# Patient Record
(demographics unavailable — no encounter records)

---

## 2024-10-14 NOTE — HISTORY OF PRESENT ILLNESS
[de-identified] : 84 year old woman with past medical history lumbar spinal stenosis s/p L4-5 posterior lumbar fusion on 1/5/15 high cholesterol, HTN referred by Dr. Shabazz for meningioma.  Ms. Painting states that she developed more frequent headaches and worsening dizziness over the past several months. Her PCP orders MRI brain which demonstrated a small sub centimeter meningioma as well as a possible pituitary nodule.  Of note, she has a history of a lumbar fusion in 2015. She reports worsening gait as well as low back pain radiating to the RIGHT thigh. She reports symptoms are worsening. She has done supervised physical therapy with no improvement. Over the past year, she has had several injections in the low back with no improvement in symptoms.  She states she is unable to walk long distances due to debilitating pain. She uses a cane for assistance with walking.  3/1/24 Office Visit: returns to review MRI lumbar spine without contrast done on 2/20/24. She continues to have RIGHT sided low back pain radiating to RIGHT thigh. She previously has done PT with no improvement in symptoms. She has seen pain management and had 3 injections about 1.5 years ago with no improvement in symptoms. MRI L-spine was reviewed which showed severe L2-4 stenosis. Discussed possible surgery of decompression and fusion. Patient would like to continue conservative management at this time.  Plan was made to return in July with new brain MRI for meningioma.  9/24/24 visit MRI L-spine and MR brain was reviewed which showed stable meningioma and severe L2-4 adjacent segmang disease. Plan was made to repeat MRI brain w/wo in on year 7/2025 and obtain CT L-spine now for possible surgery plan.

## 2024-10-14 NOTE — DATA REVIEWED
[de-identified] : L-spine wo on 10/3/24 in PACS ACC: [id number]     EXAM:  CT LUMBAR SPINE   ORDERED BY: PURVI ALBA  PROCEDURE DATE:  10/03/2024    INTERPRETATION:  LUMBOSACRAL SPINE CT  CLINICAL INFORMATION: Posterior instrumented fusion. Evaluate osseous fusion. TECHNIQUE: Axial CT images were obtained of the lumbosacral spine with coronal and sagittal reconstructions. Comparison is made to 10/5/2021.  FINDINGS:  ALIGNMENT: Grade 1 anterolisthesis at L2-L3 from L3-L4 and L4-L5. Mild retrolisthesis of L5-S1.  DISC SPACES:  L1/L2: Moderate bilateral facet arthrosis with mild disc bulging. Mild osseous foraminal narrowing.  L2/L3: Severe disc height loss with endplate sclerosis and gas in the disc space. Circumferential osseous ridging. Severe left facet arthrosis with fusion of the right facet joint. Status post laminectomy. Severe left lateral recess stenosis and moderate spinal canal stenosis.  L3/L4: Severe disc height loss with gas in the disc space and endplate sclerosis. Status post laminectomy. Severe left facet arthrosis. Mild left-sided osseous foraminal narrowing.  L4/L5: Posterior segment effusion with solid osseous fusion across the disc space and posterior elements.  L5/S1: Moderate disc height loss with gas diffusely throughout the disc space with endplate sclerosis and circumferential endplate osseous ridging. Moderate to severe left and moderate right foraminal narrowing. Moderate facet arthrosis. Central laminectomy decompressing the spinal canal.  SI JOINTS: Mild SI joint arthrosis bilaterally with vacuum phenomenon. INTRAABDOMINAL AND INTRAPELVIC SOFT TISSUES: Calcifications within the uterus. Colonic diverticulosis without diverticulitis. Aortobiiliac atherosclerotic disease.  IMPRESSION: Posterior instrumented fusion at L4-L5 with solid osseous fusion. Multilevel laminectomy from L2 through L5.  Severe multilevel degenerative disc disease that has significantly progressed at L2-L3 compared to the prior study.  Multilevel spinal canal, lateral recess and foraminal as detailed above and most advanced in the left lateral recess at L2-L3..  --- End of Report ---      CAMACHO CRAIG MD; Attending Radiologist This document has been electronically signed. Oct  7 2024 12:06PM

## 2024-10-14 NOTE — PHYSICAL EXAM
[General Appearance - Alert] : alert [General Appearance - In No Acute Distress] : in no acute distress [General Appearance - Well Nourished] : well nourished [General Appearance - Well-Appearing] : healthy appearing [Oriented To Time, Place, And Person] : oriented to person, place, and time [Impaired Insight] : insight and judgment were intact [Affect] : the affect was normal [Memory Recent] : recent memory was not impaired [Abnormal Walk] : normal gait [Limited Balance] : the patient's balance was impaired

## 2024-10-14 NOTE — REASON FOR VISIT
[Follow-Up: _____] : a [unfilled] follow-up visit [FreeTextEntry1] : known h/o severe L2-4 stenosis (recommended decomp/fusion surgery in last visit, patient wants to continue PT), sub centimeter parafalcine meningioma (sequential image only) returns to review CT L-spine wo (PACS) for severe adjacent segment disease

## 2024-10-14 NOTE — PHYSICAL EXAM
[General Appearance - Alert] : alert [General Appearance - Well Nourished] : well nourished [General Appearance - In No Acute Distress] : in no acute distress [General Appearance - Well-Appearing] : healthy appearing [Oriented To Time, Place, And Person] : oriented to person, place, and time [Impaired Insight] : insight and judgment were intact [Affect] : the affect was normal [Memory Recent] : recent memory was not impaired [Abnormal Walk] : normal gait [Limited Balance] : the patient's balance was impaired

## 2024-10-14 NOTE — HISTORY OF PRESENT ILLNESS
[de-identified] : 84 year old woman with past medical history lumbar spinal stenosis s/p L4-5 posterior lumbar fusion on 1/5/15 high cholesterol, HTN referred by Dr. Shabazz for meningioma.  Ms. Painting states that she developed more frequent headaches and worsening dizziness over the past several months. Her PCP orders MRI brain which demonstrated a small sub centimeter meningioma as well as a possible pituitary nodule.  Of note, she has a history of a lumbar fusion in 2015. She reports worsening gait as well as low back pain radiating to the RIGHT thigh. She reports symptoms are worsening. She has done supervised physical therapy with no improvement. Over the past year, she has had several injections in the low back with no improvement in symptoms.  She states she is unable to walk long distances due to debilitating pain. She uses a cane for assistance with walking.  3/1/24 Office Visit: returns to review MRI lumbar spine without contrast done on 2/20/24. She continues to have RIGHT sided low back pain radiating to RIGHT thigh. She previously has done PT with no improvement in symptoms. She has seen pain management and had 3 injections about 1.5 years ago with no improvement in symptoms. MRI L-spine was reviewed which showed severe L2-4 stenosis. Discussed possible surgery of decompression and fusion. Patient would like to continue conservative management at this time.  Plan was made to return in July with new brain MRI for meningioma.  9/24/24 visit MRI L-spine and MR brain was reviewed which showed stable meningioma and severe L2-4 adjacent segmang disease. Plan was made to repeat MRI brain w/wo in on year 7/2025 and obtain CT L-spine now for possible surgery plan.

## 2024-10-14 NOTE — DATA REVIEWED
[de-identified] : L-spine wo on 10/3/24 in PACS ACC: [id number]     EXAM:  CT LUMBAR SPINE   ORDERED BY: PURVI ALBA  PROCEDURE DATE:  10/03/2024    INTERPRETATION:  LUMBOSACRAL SPINE CT  CLINICAL INFORMATION: Posterior instrumented fusion. Evaluate osseous fusion. TECHNIQUE: Axial CT images were obtained of the lumbosacral spine with coronal and sagittal reconstructions. Comparison is made to 10/5/2021.  FINDINGS:  ALIGNMENT: Grade 1 anterolisthesis at L2-L3 from L3-L4 and L4-L5. Mild retrolisthesis of L5-S1.  DISC SPACES:  L1/L2: Moderate bilateral facet arthrosis with mild disc bulging. Mild osseous foraminal narrowing.  L2/L3: Severe disc height loss with endplate sclerosis and gas in the disc space. Circumferential osseous ridging. Severe left facet arthrosis with fusion of the right facet joint. Status post laminectomy. Severe left lateral recess stenosis and moderate spinal canal stenosis.  L3/L4: Severe disc height loss with gas in the disc space and endplate sclerosis. Status post laminectomy. Severe left facet arthrosis. Mild left-sided osseous foraminal narrowing.  L4/L5: Posterior segment effusion with solid osseous fusion across the disc space and posterior elements.  L5/S1: Moderate disc height loss with gas diffusely throughout the disc space with endplate sclerosis and circumferential endplate osseous ridging. Moderate to severe left and moderate right foraminal narrowing. Moderate facet arthrosis. Central laminectomy decompressing the spinal canal.  SI JOINTS: Mild SI joint arthrosis bilaterally with vacuum phenomenon. INTRAABDOMINAL AND INTRAPELVIC SOFT TISSUES: Calcifications within the uterus. Colonic diverticulosis without diverticulitis. Aortobiiliac atherosclerotic disease.  IMPRESSION: Posterior instrumented fusion at L4-L5 with solid osseous fusion. Multilevel laminectomy from L2 through L5.  Severe multilevel degenerative disc disease that has significantly progressed at L2-L3 compared to the prior study.  Multilevel spinal canal, lateral recess and foraminal as detailed above and most advanced in the left lateral recess at L2-L3..  --- End of Report ---      CAMACHO CRAIG MD; Attending Radiologist This document has been electronically signed. Oct  7 2024 12:06PM

## 2025-04-10 NOTE — HISTORY OF PRESENT ILLNESS
[FreeTextEntry1] : Patient is a 86yo F who presents today for breast cancer screening. Patient is adopted and fhx if unknown. Patient denies palpable masses, skin changes, or nipple discharge bilaterally.  1/10/18: B/L MG & US- [facility]ABETH 1/16/19: B/L MG & US- no evidence of disease 1/15/20: B/L MG & US- EL[facility]ABETH. 1/28/21: B/L MG & US- Dense. B/l scattered calcs, stbale. LUOQ postsurgical changes, stable. L 1.1cm axillary tail LN. BIRADS 2. 2/22/22: B/L MG & US- heterogeneously dense. EL[facility]ABETH. BI-RADS 2 2/24/23: B/L MG & US- heterogeneously dense. EL[facility]ABETH. BI-RADS 2 3/13/24 B/L MG & US- extremely dense. EL[facility]AB[facility]. BI-RADS 2 4/10/25 b/l mg&us-extremely dense.  L UOQ postsurgical changes, stable.  BI-RADS 2

## 2025-04-10 NOTE — PHYSICAL EXAM
[de-identified] : Bilateral breast/axilla/supraclavicular area: No masses, discharge, or adenopathy\par

## 2025-04-10 NOTE — HISTORY OF PRESENT ILLNESS
[FreeTextEntry1] : Patient is a 84yo F who presents today for breast cancer screening. Patient is adopted and fhx if unknown. Patient denies palpable masses, skin changes, or nipple discharge bilaterally.  1/10/18: B/L MG & US- EL[facility]ETH 1/16/19: B/L MG & US- no evidence of disease 1/15/20: B/L MG & US- ELIZABETH. 1/28/21: B/L MG & US- Dense. B/l scattered calcs, stbale. LUOQ postsurgical changes, stable. L 1.1cm axillary tail LN. BIRADS 2. 2/22/22: B/L MG & US- heterogeneously dense. [facility]. BI-RADS 2 2/24/23: B/L MG & US- heterogeneously dense. ELIZABETH. BI-RADS 2 3/13/24 B/L MG & US- extremely dense. [facility]. BI-RADS 2 4/10/25 b/l mg&us-extremely dense.  L UOQ postsurgical changes, stable.  BI-RADS 2

## 2025-04-10 NOTE — PHYSICAL EXAM
[de-identified] : Bilateral breast/axilla/supraclavicular area: No masses, discharge, or adenopathy\par

## 2025-07-15 NOTE — DATA REVIEWED
[de-identified] :   	 ACC: [id number]     EXAM:  CT LUMBAR SPINE   ORDERED BY: PURVI ALBA  PROCEDURE DATE:  10/03/2024    INTERPRETATION:  LUMBOSACRAL SPINE CT  CLINICAL INFORMATION: Posterior instrumented fusion. Evaluate osseous fusion. TECHNIQUE: Axial CT images were obtained of the lumbosacral spine with coronal and sagittal reconstructions. Comparison is made to 10/5/2021.  FINDINGS:  ALIGNMENT: Grade 1 anterolisthesis at L2-L3 from L3-L4 and L4-L5. Mild retrolisthesis of L5-S1.  DISC SPACES:  L1/L2: Moderate bilateral facet arthrosis with mild disc bulging. Mild osseous foraminal narrowing.  L2/L3: Severe disc height loss with endplate sclerosis and gas in the disc space. Circumferential osseous ridging. Severe left facet arthrosis with fusion of the right facet joint. Status post laminectomy. Severe left lateral recess stenosis and moderate spinal canal stenosis.  L3/L4: Severe disc height loss with gas in the disc space and endplate sclerosis. Status post laminectomy. Severe left facet arthrosis. Mild left-sided osseous foraminal narrowing.  L4/L5: Posterior segment effusion with solid osseous fusion across the disc space and posterior elements.  L5/S1: Moderate disc height loss with gas diffusely throughout the disc space with endplate sclerosis and circumferential endplate osseous ridging. Moderate to severe left and moderate right foraminal narrowing. Moderate facet arthrosis. Central laminectomy decompressing the spinal canal.  SI JOINTS: Mild SI joint arthrosis bilaterally with vacuum phenomenon. INTRAABDOMINAL AND INTRAPELVIC SOFT TISSUES: Calcifications within the uterus. Colonic diverticulosis without diverticulitis. Aortobiiliac atherosclerotic disease.  IMPRESSION: Posterior instrumented fusion at L4-L5 with solid osseous fusion. Multilevel laminectomy from L2 through L5.  Severe multilevel degenerative disc disease that has significantly progressed at L2-L3 compared to the prior study.  Multilevel spinal canal, lateral recess and foraminal as detailed above and most advanced in the left lateral recess at L2-L3..  --- End of Report ---  [de-identified] : ACC: [id number]     EXAM:  MR SPINE LUMBAR   ORDERED BY: DEMETRI GORDON  PROCEDURE DATE:  02/20/2024    INTERPRETATION:  CLINICAL INFORMATION: Worsening lumbar radiculopathy, lumbar stenosis.  ADDITIONAL CLINICAL INFORMATION: Low back muscle strain S39.012A  TECHNIQUE: Multiplanar, multisequence MRI was performed of the lumbar spine. IV Contrast: NONE  PRIOR STUDIES: MRI lumbar spine dated 4/27/2022.  FINDINGS:  LOCALIZER: Levocurvature of the thoracic spine. BONES: Status post fusion at L4-L5 with bilateral transpedicular screws and vertical supporting rods. Status post L4-L5 laminectomy. Vertebral body heights are maintained. There are edematous endplate degenerative changes at L2-L3. There are fatty endplate degenerative changes throughout the lumbar spine. DISCS: There is diffuse disc desiccation with severe disc space narrowing L2-3 through L5-S1. ALIGNMENT: Grade 1 anterolisthesis L2-3, L3-4 and L4-5. SACROILIAC JOINTS/SACRUM: There is no sacral fracture. The SI joints are partially visualized but are intact. CONUS AND CAUDA EQUINA: The distal cord and conus are normal in signal. Conus terminates at L1. VISUALIZED INTRAPELVIC/INTRA-ABDOMINAL SOFT TISSUES: There are liver left parapelvic cysts. PARASPINAL SOFT TISSUES: Normal.   INDIVIDUAL LEVELS:  LOWER THORACIC SPINE: Disc bulges at T11-12 and T12-L1 with facet hypertrophy resulting in mild spinal canal stenosis or neural foraminal narrowing  L1-L2: Disc bulge with facet hypertrophy resulting in moderate spinal canal stenosis and bilateral neural foraminal narrowing which has progressed since prior exam L2-L3: Uncovering of the posterior disc margin with a superimposed disc bulge along with facet and ligamentum flavum hypertrophy resulting in severe spinal canal stenosis and left lateral recess narrowing with severe bilateral neural foraminal narrowing. These findings are stable from prior exam L3-L4: Uncovering of the posterior disc margin with facet hypertrophy resulting in severe spinal canal stenosis and bilateral neural foraminal narrowing which is stable from prior exam. L4-L5: Decompressive laminectomy without significant spinal canal stenosis. There is mild to moderate bilateral neural foraminal narrowing which is stable from prior exam L5-S1: There is bilateral neural foraminal narrowing secondary to posterior osseous ridging and facet hypertrophy which is stable from prior exam.   IMPRESSION:  Since prior MRI 4/27/2022:  Again demonstrated is severe spinal canal stenosis and lateral recess narrowing with bilateral neural foraminal narrowing L2-3 and L3-4 is not significant change from prior exam.  L1-L2 moderate spinal canal stenosis and bilateral neural foraminal narrowing which has progressed since prior exam.  Status post fusion L4-5 as above.  --- End of Report ---

## 2025-07-15 NOTE — HISTORY OF PRESENT ILLNESS
[FreeTextEntry1] :  85 year old woman with past medical history lumbar spinal stenosis s/p L4-5 posterior lumbar fusion on 1/5/15 high cholesterol, HTN referred by Dr. Shabazz for meningioma.  Ms. Painting states that she developed more frequent headaches and worsening dizziness over the past several months. Her PCP orders MRI brain which demonstrated a small sub centimeter meningioma as well as a possible pituitary nodule.  Of note, she has a history of a lumbar fusion in 2015. She reports worsening gait as well as low back pain radiating to the RIGHT thigh. She reports symptoms are worsening. She has done supervised physical therapy with no improvement. Over the past year, she has had several injections in the low back with no improvement in symptoms. She states she is unable to walk long distances due to debilitating pain. She uses a cane for assistance with walking.  3/1/24 Office Visit: returns to review MRI lumbar spine without contrast done on 2/20/24. She continues to have RIGHT sided low back pain radiating to RIGHT thigh. She previously has done PT with no improvement in symptoms. She has seen pain management and had 3 injections about 1.5 years ago with no improvement in symptoms. MRI L-spine was reviewed which showed severe L2-4 stenosis. Discussed possible surgery of decompression and fusion. Patient would like to continue conservative management at this time. Plan was made to return in July with new brain MRI for meningioma.  9/24/24 visit MRI L-spine and MR brain was reviewed which showed stable meningioma and severe L2-4 adjacent segment disease. Plan was made to repeat MRI brain w/wo in on year 7/2025 and obtain CT L-spine now for possible surgery plan.  Again discussed possible surgical intervention (L2-S1 posterior decompression and fusion). Patient would like to continue conservative management. Provided referral to PT/pain management, RTC in 3 months to reevaluate symptoms  TODAY:  reports she was hospitalized in April 2/2 persistent, severe lower back pain with bilateral sciatica, R > L. she currently takes oxycodone, although it makes her nauseous. she has just restarted gabapentin. she reports she has new L foot drop. was undergoing PT after she was discharged from the hospital but has not been participating in PT since May.  Last MEGAN was in February 2025, reports no relief.

## 2025-07-15 NOTE — REASON FOR VISIT
[Follow-Up: _____] : a [unfilled] follow-up visit [FreeTextEntry1] : known h/o severe L2-4 stenosis (recommended decomp/fusion surgery in last visit, parafalcine meningioma  s/p decompressive lumbar laminectomy L2 L3 L4 L5 inclusive facetectomy and foraminotomies L2 -3 L3 -4 L4-L5 and L5-S1 bilaterally diskectomy L4-L5 instrumented pedicle screw fusion L4-L5 1/5/2015

## 2025-07-15 NOTE — DATA REVIEWED
[de-identified] :   	 ACC: [id number]     EXAM:  CT LUMBAR SPINE   ORDERED BY: PURVI ALBA  PROCEDURE DATE:  10/03/2024    INTERPRETATION:  LUMBOSACRAL SPINE CT  CLINICAL INFORMATION: Posterior instrumented fusion. Evaluate osseous fusion. TECHNIQUE: Axial CT images were obtained of the lumbosacral spine with coronal and sagittal reconstructions. Comparison is made to 10/5/2021.  FINDINGS:  ALIGNMENT: Grade 1 anterolisthesis at L2-L3 from L3-L4 and L4-L5. Mild retrolisthesis of L5-S1.  DISC SPACES:  L1/L2: Moderate bilateral facet arthrosis with mild disc bulging. Mild osseous foraminal narrowing.  L2/L3: Severe disc height loss with endplate sclerosis and gas in the disc space. Circumferential osseous ridging. Severe left facet arthrosis with fusion of the right facet joint. Status post laminectomy. Severe left lateral recess stenosis and moderate spinal canal stenosis.  L3/L4: Severe disc height loss with gas in the disc space and endplate sclerosis. Status post laminectomy. Severe left facet arthrosis. Mild left-sided osseous foraminal narrowing.  L4/L5: Posterior segment effusion with solid osseous fusion across the disc space and posterior elements.  L5/S1: Moderate disc height loss with gas diffusely throughout the disc space with endplate sclerosis and circumferential endplate osseous ridging. Moderate to severe left and moderate right foraminal narrowing. Moderate facet arthrosis. Central laminectomy decompressing the spinal canal.  SI JOINTS: Mild SI joint arthrosis bilaterally with vacuum phenomenon. INTRAABDOMINAL AND INTRAPELVIC SOFT TISSUES: Calcifications within the uterus. Colonic diverticulosis without diverticulitis. Aortobiiliac atherosclerotic disease.  IMPRESSION: Posterior instrumented fusion at L4-L5 with solid osseous fusion. Multilevel laminectomy from L2 through L5.  Severe multilevel degenerative disc disease that has significantly progressed at L2-L3 compared to the prior study.  Multilevel spinal canal, lateral recess and foraminal as detailed above and most advanced in the left lateral recess at L2-L3..  --- End of Report ---  [de-identified] : ACC: [id number]     EXAM:  MR SPINE LUMBAR   ORDERED BY: DEMETRI GORDON  PROCEDURE DATE:  02/20/2024    INTERPRETATION:  CLINICAL INFORMATION: Worsening lumbar radiculopathy, lumbar stenosis.  ADDITIONAL CLINICAL INFORMATION: Low back muscle strain S39.012A  TECHNIQUE: Multiplanar, multisequence MRI was performed of the lumbar spine. IV Contrast: NONE  PRIOR STUDIES: MRI lumbar spine dated 4/27/2022.  FINDINGS:  LOCALIZER: Levocurvature of the thoracic spine. BONES: Status post fusion at L4-L5 with bilateral transpedicular screws and vertical supporting rods. Status post L4-L5 laminectomy. Vertebral body heights are maintained. There are edematous endplate degenerative changes at L2-L3. There are fatty endplate degenerative changes throughout the lumbar spine. DISCS: There is diffuse disc desiccation with severe disc space narrowing L2-3 through L5-S1. ALIGNMENT: Grade 1 anterolisthesis L2-3, L3-4 and L4-5. SACROILIAC JOINTS/SACRUM: There is no sacral fracture. The SI joints are partially visualized but are intact. CONUS AND CAUDA EQUINA: The distal cord and conus are normal in signal. Conus terminates at L1. VISUALIZED INTRAPELVIC/INTRA-ABDOMINAL SOFT TISSUES: There are liver left parapelvic cysts. PARASPINAL SOFT TISSUES: Normal.   INDIVIDUAL LEVELS:  LOWER THORACIC SPINE: Disc bulges at T11-12 and T12-L1 with facet hypertrophy resulting in mild spinal canal stenosis or neural foraminal narrowing  L1-L2: Disc bulge with facet hypertrophy resulting in moderate spinal canal stenosis and bilateral neural foraminal narrowing which has progressed since prior exam L2-L3: Uncovering of the posterior disc margin with a superimposed disc bulge along with facet and ligamentum flavum hypertrophy resulting in severe spinal canal stenosis and left lateral recess narrowing with severe bilateral neural foraminal narrowing. These findings are stable from prior exam L3-L4: Uncovering of the posterior disc margin with facet hypertrophy resulting in severe spinal canal stenosis and bilateral neural foraminal narrowing which is stable from prior exam. L4-L5: Decompressive laminectomy without significant spinal canal stenosis. There is mild to moderate bilateral neural foraminal narrowing which is stable from prior exam L5-S1: There is bilateral neural foraminal narrowing secondary to posterior osseous ridging and facet hypertrophy which is stable from prior exam.   IMPRESSION:  Since prior MRI 4/27/2022:  Again demonstrated is severe spinal canal stenosis and lateral recess narrowing with bilateral neural foraminal narrowing L2-3 and L3-4 is not significant change from prior exam.  L1-L2 moderate spinal canal stenosis and bilateral neural foraminal narrowing which has progressed since prior exam.  Status post fusion L4-5 as above.  --- End of Report ---

## 2025-07-15 NOTE — DATA REVIEWED
[de-identified] :   	 ACC: [id number]     EXAM:  CT LUMBAR SPINE   ORDERED BY: PURVI ALAB  PROCEDURE DATE:  10/03/2024    INTERPRETATION:  LUMBOSACRAL SPINE CT  CLINICAL INFORMATION: Posterior instrumented fusion. Evaluate osseous fusion. TECHNIQUE: Axial CT images were obtained of the lumbosacral spine with coronal and sagittal reconstructions. Comparison is made to 10/5/2021.  FINDINGS:  ALIGNMENT: Grade 1 anterolisthesis at L2-L3 from L3-L4 and L4-L5. Mild retrolisthesis of L5-S1.  DISC SPACES:  L1/L2: Moderate bilateral facet arthrosis with mild disc bulging. Mild osseous foraminal narrowing.  L2/L3: Severe disc height loss with endplate sclerosis and gas in the disc space. Circumferential osseous ridging. Severe left facet arthrosis with fusion of the right facet joint. Status post laminectomy. Severe left lateral recess stenosis and moderate spinal canal stenosis.  L3/L4: Severe disc height loss with gas in the disc space and endplate sclerosis. Status post laminectomy. Severe left facet arthrosis. Mild left-sided osseous foraminal narrowing.  L4/L5: Posterior segment effusion with solid osseous fusion across the disc space and posterior elements.  L5/S1: Moderate disc height loss with gas diffusely throughout the disc space with endplate sclerosis and circumferential endplate osseous ridging. Moderate to severe left and moderate right foraminal narrowing. Moderate facet arthrosis. Central laminectomy decompressing the spinal canal.  SI JOINTS: Mild SI joint arthrosis bilaterally with vacuum phenomenon. INTRAABDOMINAL AND INTRAPELVIC SOFT TISSUES: Calcifications within the uterus. Colonic diverticulosis without diverticulitis. Aortobiiliac atherosclerotic disease.  IMPRESSION: Posterior instrumented fusion at L4-L5 with solid osseous fusion. Multilevel laminectomy from L2 through L5.  Severe multilevel degenerative disc disease that has significantly progressed at L2-L3 compared to the prior study.  Multilevel spinal canal, lateral recess and foraminal as detailed above and most advanced in the left lateral recess at L2-L3..  --- End of Report ---  [de-identified] : ACC: [id number]     EXAM:  MR SPINE LUMBAR   ORDERED BY: DEMETRI GORDON  PROCEDURE DATE:  02/20/2024    INTERPRETATION:  CLINICAL INFORMATION: Worsening lumbar radiculopathy, lumbar stenosis.  ADDITIONAL CLINICAL INFORMATION: Low back muscle strain S39.012A  TECHNIQUE: Multiplanar, multisequence MRI was performed of the lumbar spine. IV Contrast: NONE  PRIOR STUDIES: MRI lumbar spine dated 4/27/2022.  FINDINGS:  LOCALIZER: Levocurvature of the thoracic spine. BONES: Status post fusion at L4-L5 with bilateral transpedicular screws and vertical supporting rods. Status post L4-L5 laminectomy. Vertebral body heights are maintained. There are edematous endplate degenerative changes at L2-L3. There are fatty endplate degenerative changes throughout the lumbar spine. DISCS: There is diffuse disc desiccation with severe disc space narrowing L2-3 through L5-S1. ALIGNMENT: Grade 1 anterolisthesis L2-3, L3-4 and L4-5. SACROILIAC JOINTS/SACRUM: There is no sacral fracture. The SI joints are partially visualized but are intact. CONUS AND CAUDA EQUINA: The distal cord and conus are normal in signal. Conus terminates at L1. VISUALIZED INTRAPELVIC/INTRA-ABDOMINAL SOFT TISSUES: There are liver left parapelvic cysts. PARASPINAL SOFT TISSUES: Normal.   INDIVIDUAL LEVELS:  LOWER THORACIC SPINE: Disc bulges at T11-12 and T12-L1 with facet hypertrophy resulting in mild spinal canal stenosis or neural foraminal narrowing  L1-L2: Disc bulge with facet hypertrophy resulting in moderate spinal canal stenosis and bilateral neural foraminal narrowing which has progressed since prior exam L2-L3: Uncovering of the posterior disc margin with a superimposed disc bulge along with facet and ligamentum flavum hypertrophy resulting in severe spinal canal stenosis and left lateral recess narrowing with severe bilateral neural foraminal narrowing. These findings are stable from prior exam L3-L4: Uncovering of the posterior disc margin with facet hypertrophy resulting in severe spinal canal stenosis and bilateral neural foraminal narrowing which is stable from prior exam. L4-L5: Decompressive laminectomy without significant spinal canal stenosis. There is mild to moderate bilateral neural foraminal narrowing which is stable from prior exam L5-S1: There is bilateral neural foraminal narrowing secondary to posterior osseous ridging and facet hypertrophy which is stable from prior exam.   IMPRESSION:  Since prior MRI 4/27/2022:  Again demonstrated is severe spinal canal stenosis and lateral recess narrowing with bilateral neural foraminal narrowing L2-3 and L3-4 is not significant change from prior exam.  L1-L2 moderate spinal canal stenosis and bilateral neural foraminal narrowing which has progressed since prior exam.  Status post fusion L4-5 as above.  --- End of Report ---

## 2025-07-15 NOTE — DATA REVIEWED
[de-identified] :   	 ACC: [id number]     EXAM:  CT LUMBAR SPINE   ORDERED BY: PURVI ALBA  PROCEDURE DATE:  10/03/2024    INTERPRETATION:  LUMBOSACRAL SPINE CT  CLINICAL INFORMATION: Posterior instrumented fusion. Evaluate osseous fusion. TECHNIQUE: Axial CT images were obtained of the lumbosacral spine with coronal and sagittal reconstructions. Comparison is made to 10/5/2021.  FINDINGS:  ALIGNMENT: Grade 1 anterolisthesis at L2-L3 from L3-L4 and L4-L5. Mild retrolisthesis of L5-S1.  DISC SPACES:  L1/L2: Moderate bilateral facet arthrosis with mild disc bulging. Mild osseous foraminal narrowing.  L2/L3: Severe disc height loss with endplate sclerosis and gas in the disc space. Circumferential osseous ridging. Severe left facet arthrosis with fusion of the right facet joint. Status post laminectomy. Severe left lateral recess stenosis and moderate spinal canal stenosis.  L3/L4: Severe disc height loss with gas in the disc space and endplate sclerosis. Status post laminectomy. Severe left facet arthrosis. Mild left-sided osseous foraminal narrowing.  L4/L5: Posterior segment effusion with solid osseous fusion across the disc space and posterior elements.  L5/S1: Moderate disc height loss with gas diffusely throughout the disc space with endplate sclerosis and circumferential endplate osseous ridging. Moderate to severe left and moderate right foraminal narrowing. Moderate facet arthrosis. Central laminectomy decompressing the spinal canal.  SI JOINTS: Mild SI joint arthrosis bilaterally with vacuum phenomenon. INTRAABDOMINAL AND INTRAPELVIC SOFT TISSUES: Calcifications within the uterus. Colonic diverticulosis without diverticulitis. Aortobiiliac atherosclerotic disease.  IMPRESSION: Posterior instrumented fusion at L4-L5 with solid osseous fusion. Multilevel laminectomy from L2 through L5.  Severe multilevel degenerative disc disease that has significantly progressed at L2-L3 compared to the prior study.  Multilevel spinal canal, lateral recess and foraminal as detailed above and most advanced in the left lateral recess at L2-L3..  --- End of Report ---  [de-identified] : ACC: [id number]     EXAM:  MR SPINE LUMBAR   ORDERED BY: DEMETRI GORDON  PROCEDURE DATE:  02/20/2024    INTERPRETATION:  CLINICAL INFORMATION: Worsening lumbar radiculopathy, lumbar stenosis.  ADDITIONAL CLINICAL INFORMATION: Low back muscle strain S39.012A  TECHNIQUE: Multiplanar, multisequence MRI was performed of the lumbar spine. IV Contrast: NONE  PRIOR STUDIES: MRI lumbar spine dated 4/27/2022.  FINDINGS:  LOCALIZER: Levocurvature of the thoracic spine. BONES: Status post fusion at L4-L5 with bilateral transpedicular screws and vertical supporting rods. Status post L4-L5 laminectomy. Vertebral body heights are maintained. There are edematous endplate degenerative changes at L2-L3. There are fatty endplate degenerative changes throughout the lumbar spine. DISCS: There is diffuse disc desiccation with severe disc space narrowing L2-3 through L5-S1. ALIGNMENT: Grade 1 anterolisthesis L2-3, L3-4 and L4-5. SACROILIAC JOINTS/SACRUM: There is no sacral fracture. The SI joints are partially visualized but are intact. CONUS AND CAUDA EQUINA: The distal cord and conus are normal in signal. Conus terminates at L1. VISUALIZED INTRAPELVIC/INTRA-ABDOMINAL SOFT TISSUES: There are liver left parapelvic cysts. PARASPINAL SOFT TISSUES: Normal.   INDIVIDUAL LEVELS:  LOWER THORACIC SPINE: Disc bulges at T11-12 and T12-L1 with facet hypertrophy resulting in mild spinal canal stenosis or neural foraminal narrowing  L1-L2: Disc bulge with facet hypertrophy resulting in moderate spinal canal stenosis and bilateral neural foraminal narrowing which has progressed since prior exam L2-L3: Uncovering of the posterior disc margin with a superimposed disc bulge along with facet and ligamentum flavum hypertrophy resulting in severe spinal canal stenosis and left lateral recess narrowing with severe bilateral neural foraminal narrowing. These findings are stable from prior exam L3-L4: Uncovering of the posterior disc margin with facet hypertrophy resulting in severe spinal canal stenosis and bilateral neural foraminal narrowing which is stable from prior exam. L4-L5: Decompressive laminectomy without significant spinal canal stenosis. There is mild to moderate bilateral neural foraminal narrowing which is stable from prior exam L5-S1: There is bilateral neural foraminal narrowing secondary to posterior osseous ridging and facet hypertrophy which is stable from prior exam.   IMPRESSION:  Since prior MRI 4/27/2022:  Again demonstrated is severe spinal canal stenosis and lateral recess narrowing with bilateral neural foraminal narrowing L2-3 and L3-4 is not significant change from prior exam.  L1-L2 moderate spinal canal stenosis and bilateral neural foraminal narrowing which has progressed since prior exam.  Status post fusion L4-5 as above.  --- End of Report ---

## 2025-07-15 NOTE — ASSESSMENT
[FreeTextEntry1] : Symptomatic severe adjacent segment disease, again discussed possible surgical intervention (L2-S1 posterior decompression and fusion). Given patient's age and comorbidities, she would have significant risk of complication with this surgery. She wishes to hold off on surgery and pursue conservative measures. - due for MRI brain w/wo now to fu meningioma - repeat MRI lumbar spine w/o for worsening ambulation and new foot drop, suspect related to her severe lumbar stenosis - meclizine refill sent - PT referral provided - pain management referral provided; also instructed her to restart gabapentin - RTC after imaging completed  Patient and patient's family verbalize agreement and understanding with plan of care.  I, Dr. Jun Edwards, personally performed the evaluation and management (E/M) services for this established patient who presents today with (a) new problem(s)/exacerbation of (an) existing condition(s). That E/M includes conducting the clinically appropriate interval history &/or exam, assessing all new/exacerbated conditions, and establishing a new plan of care. Today, my SUNDAY, Ramo Rolon, was here to observe my evaluation and management service for this new problem/exacerbated condition and follow the plan of care established by me going forward.

## 2025-07-23 NOTE — HISTORY OF PRESENT ILLNESS
[FreeTextEntry1] : Patient is an 85 y.o. female with PMH of Pacemaker, Meningioma, Vertigo, HTN, HLD, Hepatic disease, chronic low back pain now s/p decompressive lumbar laminectomy L2 L3 L4 L5 inclusive facetectomy and foraminotomies L2-3 L3-4 L4-L5 and L5-S1 bilaterally diskectomy L4-L5 instrumented pedicle screw fusion L4-L5 1/5/2015. Patient is referred by Dr. Jun Edwards for evaluation of low back pain. Dr. Edwards recommended decomp/fusion surgery in his last visit for severe L2-4 spinal stenosis. Patient reports that she and Dr. Edwards are not in favor of spinal surgery.   Patient describes back pain as 10/10 currently, 9/10 on average, radiates to right anterior and lateral thigh (shock-like, burning) and lateral calf and entire foot. Pain worsened with standing and walking and lumbar extension. Improved with forward flexion and sitting and medications. Patient also reports frequent falls, last fall was on Sunday. Frequent falls are attributed to vertigo and balance impairment.   Denies bowel and bladder incontinence, saddle anesthesia, fever, chills. Denies history of DM or anticoagulant use.  Currently taking oxycodone 2.5 mg BID about 4x/wk.  Patient has had 2 epidural injections in the past, which both did not provide any pain relief. Last MEGAN was at [facility] on 2/28/25.   Patient reports she is scheduled for an MRI Brain and MRI lumbar spine in August. She is also scheduled for a 3rd epidural injection at [facility] on Aug 1.  Allergic to codeine and contrast Imaging done at [facility] and [facility].

## 2025-07-23 NOTE — ASSESSMENT
[FreeTextEntry1] : Patient is an 85 y.o. female with PMH of Pacemaker, Meningioma, Vertigo, HTN, HLD, Hepatic disease, chronic low back pain now s/p decompressive lumbar laminectomy L2 L3 L4 L5 inclusive facetectomy and foraminotomies L2-3 L3-4 L4-L5 and L5-S1 bilaterally diskectomy L4-L5 instrumented pedicle screw fusion L4-L5 1/5/2015. Patient is referred by Dr. Jun Edwards for evaluation of low back pain. Dr. Edwards recommended decomp/fusion surgery in his last visit for severe L2-4 spinal stenosis. Patient reports that she and Dr. Edwards are not in favor of spinal surgery.   Patient describes back pain as 10/10 currently, 9/10 on average, radiates to right anterior and lateral thigh (shock-like, burning) and lateral calf and entire foot. Pain worsened with standing and walking and lumbar extension. Improved with forward flexion and sitting and medications. Patient also reports frequent falls, last fall was on Sunday. Frequent falls are attributed to vertigo and balance impairment.   Patient with severe spinal stenosis of L1/L2, L2/L3, L3/L4.    Plan: - MRI lumbar spine reviewed and discussed at length with patient The patient has failed rest, activity modification, 6 weeks of physician directed physical therapy, NSAIDs, muscle relaxants, and neuropathic medications.  They have also failed acupuncture, chiropractic care, and surgical evaluation. At this point an interventional therapy targeted at alleviating their pain and improving their functionality is a reasonable treatment option. - Plan for T12-L1 Interlaminar MEGAN. Risks and benefits discussed with patient. Patient expresses understanding and would like to proceed. Will submit request for financial authorization.   -The potential benefits as well as rare but possible risks were reviewed with the patient.  These risks including infection including epidural abscess, meningitis, osteomyelitis, and discitis, bleeding including epidural hematoma, nerve injury, paralysis, failure to relieve pain or worse pain, headache, pneumothorax, elevated blood sugars, allergic reactions, adverse reactions to medications, vasovagal reactions, falls, etc. Following that discussion, we decided together that the potential benefits outweigh the potential risks and we decided to proceed with scheduling the procedure.  I answered all the patient's questions about the procedure including the technical details of the procedure and also offered that if any other questions arise, we will also be happy to answer those on the day of the procedure. All questions today were answered to the patient's satisfaction, and the patient stated their verbal understanding.
ambulatory

## 2025-07-23 NOTE — PHYSICAL EXAM
[Normal muscle bulk without asymmetry] : normal muscle bulk without asymmetry [Facet Tenderness] : facet tenderness [Paraspinal Tenderness] : paraspinal tenderness [Antalgic] : antalgic [Walker] : ambulates with walker [SLR] : positive straight leg raise [Normal] : Skin color, texture, turgor normal, no rashes or lesions in the four extremities and back [Spinous Process Tenderness] : no spinous process tenderness [de-identified] : SLR positive bilaterally. Pain with lumbar extension, relief of pain with lumbar flexion. Sensation diminished in bilateral LE below knees. Muscle Strength 4/5 in bilateral LE except Left Dorsiflexion 3/5.